# Patient Record
Sex: MALE | Race: WHITE | Employment: FULL TIME | URBAN - NONMETROPOLITAN AREA
[De-identification: names, ages, dates, MRNs, and addresses within clinical notes are randomized per-mention and may not be internally consistent; named-entity substitution may affect disease eponyms.]

---

## 2024-10-03 ENCOUNTER — TELEPHONE (OUTPATIENT)
Dept: CARDIOLOGY CLINIC | Age: 64
End: 2024-10-03

## 2024-10-04 NOTE — TELEPHONE ENCOUNTER
Pt has an appt on monday with Dr. Angeles and he jordan l call us back monday and let us know how to proceed.

## 2024-10-07 ENCOUNTER — OFFICE VISIT (OUTPATIENT)
Dept: CARDIOLOGY CLINIC | Age: 64
End: 2024-10-07
Payer: COMMERCIAL

## 2024-10-07 VITALS
DIASTOLIC BLOOD PRESSURE: 78 MMHG | HEIGHT: 71 IN | HEART RATE: 61 BPM | BODY MASS INDEX: 28.56 KG/M2 | WEIGHT: 204 LBS | SYSTOLIC BLOOD PRESSURE: 118 MMHG

## 2024-10-07 DIAGNOSIS — G45.9 TIA (TRANSIENT ISCHEMIC ATTACK): Primary | ICD-10-CM

## 2024-10-07 PROCEDURE — 99204 OFFICE O/P NEW MOD 45 MIN: CPT | Performed by: INTERNAL MEDICINE

## 2024-10-07 RX ORDER — MELOXICAM 7.5 MG/1
TABLET ORAL
COMMUNITY
Start: 2024-08-14

## 2024-10-07 RX ORDER — ATORVASTATIN CALCIUM 20 MG/1
TABLET, FILM COATED ORAL
COMMUNITY
Start: 2024-08-14

## 2024-10-07 RX ORDER — CLOPIDOGREL BISULFATE 75 MG/1
TABLET ORAL
COMMUNITY
Start: 2024-10-02

## 2024-10-07 NOTE — PROGRESS NOTES
[facility] [place] [facility]  [facility] CARDIOLOGY  [address]  [address]  [place] [facility] [address]  Dept: [phone]  Dept Fax: [phone]  Loc: [phone]    Visit Date: 10/7/2024    Mr. Bhardwaj is a 64 y.o. male  who presented for:  Chief Complaint   Patient presents with    Follow-up     Referred from Dr. Angeles       HPI:   65 yo M with recent hx of TIA is referred for RUBI.  He underwent echo which showed possible ASD.  He has seen Dr. Henley from cardio and Dr. Plunkett for neurology. Apparently had event monitor, no results for me to review.  He has no residual symptoms.  He is here to discuss RUBI.          Current Outpatient Medications:     meloxicam (MOBIC) 7.5 MG tablet, , Disp: , Rfl:     clopidogrel (PLAVIX) 75 MG tablet, , Disp: , Rfl:     atorvastatin (LIPITOR) 20 MG tablet, , Disp: , Rfl:     Past Medical History  Sesar  has no past medical history on file.    Social History  Sesar  reports that he has never smoked. He has never used smokeless tobacco. He reports current alcohol use. He reports that he does not use drugs.    Family History  Sesar family history includes Cancer in his mother; Heart Attack in his father.    Past Surgical History   Past Surgical History:   Procedure Laterality Date    ROTATOR CUFF REPAIR Left     2016    ROTATOR CUFF REPAIR Right     2023       Subjective:     REVIEW OF SYSTEMS  Constitutional: denies sweats, chills and fever  HENT: denies  congestion, sinus pressure, sneezing and sore throat.    Eyes: denies  pain, discharge, redness and itching.   Respiratory: denies apnea, cough  Gastrointestinal: denies blood in stool, constipation, diarrhea   Endocrine: denies cold intolerance, heat intolerance, polydipsia.  Genitourinary: denies dysuria, enuresis, flank pain and hematuria.   Musculoskeletal: denies arthralgias, joint swelling and neck pain.   Neurological: denies numbness and headaches.   Psychiatric/Behavioral: denies

## 2024-10-18 RX ORDER — SODIUM CHLORIDE 9 MG/ML
25 INJECTION, SOLUTION INTRAVENOUS PRN
Status: DISCONTINUED | OUTPATIENT
Start: 2024-10-18 | End: 2024-10-21 | Stop reason: HOSPADM

## 2024-10-18 RX ORDER — SODIUM CHLORIDE 0.9 % (FLUSH) 0.9 %
5-40 SYRINGE (ML) INJECTION EVERY 12 HOURS SCHEDULED
Status: DISCONTINUED | OUTPATIENT
Start: 2024-10-18 | End: 2024-10-21 | Stop reason: HOSPADM

## 2024-10-18 RX ORDER — SODIUM CHLORIDE 0.9 % (FLUSH) 0.9 %
5-40 SYRINGE (ML) INJECTION PRN
Status: DISCONTINUED | OUTPATIENT
Start: 2024-10-18 | End: 2024-10-21 | Stop reason: HOSPADM

## 2024-10-18 RX ORDER — SODIUM CHLORIDE 9 MG/ML
INJECTION, SOLUTION INTRAVENOUS CONTINUOUS
Status: DISCONTINUED | OUTPATIENT
Start: 2024-10-18 | End: 2024-10-21 | Stop reason: HOSPADM

## 2024-10-21 ENCOUNTER — TELEPHONE (OUTPATIENT)
Dept: CARDIOLOGY CLINIC | Age: 64
End: 2024-10-21

## 2024-10-21 ENCOUNTER — HOSPITAL ENCOUNTER (OUTPATIENT)
Age: 64
Setting detail: OUTPATIENT SURGERY
Discharge: HOME | End: 2024-10-21
Attending: INTERNAL MEDICINE | Admitting: INTERNAL MEDICINE
Payer: COMMERCIAL

## 2024-10-21 ENCOUNTER — APPOINTMENT (OUTPATIENT)
Age: 64
End: 2024-10-21
Attending: INTERNAL MEDICINE
Payer: COMMERCIAL

## 2024-10-21 VITALS
BODY MASS INDEX: 28.22 KG/M2 | RESPIRATION RATE: 14 BRPM | DIASTOLIC BLOOD PRESSURE: 73 MMHG | OXYGEN SATURATION: 93 % | TEMPERATURE: 97.2 F | SYSTOLIC BLOOD PRESSURE: 119 MMHG | WEIGHT: 201.6 LBS | HEART RATE: 53 BPM | HEIGHT: 71 IN

## 2024-10-21 DIAGNOSIS — G45.9 TIA (TRANSIENT ISCHEMIC ATTACK): ICD-10-CM

## 2024-10-21 LAB — ECHO BSA: 2.14 M2

## 2024-10-21 PROCEDURE — 2580000003 HC RX 258: Performed by: INTERNAL MEDICINE

## 2024-10-21 PROCEDURE — 93312 ECHO TRANSESOPHAGEAL: CPT | Performed by: INTERNAL MEDICINE

## 2024-10-21 PROCEDURE — 2709999900 HC NON-CHARGEABLE SUPPLY: Performed by: INTERNAL MEDICINE

## 2024-10-21 PROCEDURE — 6370000000 HC RX 637 (ALT 250 FOR IP)

## 2024-10-21 PROCEDURE — 99152 MOD SED SAME PHYS/QHP 5/>YRS: CPT | Performed by: INTERNAL MEDICINE

## 2024-10-21 PROCEDURE — 6360000002 HC RX W HCPCS: Performed by: INTERNAL MEDICINE

## 2024-10-21 PROCEDURE — 7100000010 HC PHASE II RECOVERY - FIRST 15 MIN: Performed by: INTERNAL MEDICINE

## 2024-10-21 PROCEDURE — 7100000011 HC PHASE II RECOVERY - ADDTL 15 MIN: Performed by: INTERNAL MEDICINE

## 2024-10-21 PROCEDURE — 93312 ECHO TRANSESOPHAGEAL: CPT

## 2024-10-21 RX ORDER — FENTANYL CITRATE 50 UG/ML
INJECTION, SOLUTION INTRAMUSCULAR; INTRAVENOUS PRN
Status: DISCONTINUED | OUTPATIENT
Start: 2024-10-21 | End: 2024-10-21 | Stop reason: ALTCHOICE

## 2024-10-21 RX ORDER — MIDAZOLAM HYDROCHLORIDE 1 MG/ML
INJECTION, SOLUTION INTRAMUSCULAR; INTRAVENOUS PRN
Status: DISCONTINUED | OUTPATIENT
Start: 2024-10-21 | End: 2024-10-21 | Stop reason: ALTCHOICE

## 2024-10-21 RX ORDER — ASPIRIN 81 MG/1
81 TABLET ORAL DAILY
COMMUNITY

## 2024-10-21 RX ADMIN — SODIUM CHLORIDE: 9 INJECTION, SOLUTION INTRAVENOUS at 10:48

## 2024-10-21 ASSESSMENT — PAIN - FUNCTIONAL ASSESSMENT
PAIN_FUNCTIONAL_ASSESSMENT: 0-10
PAIN_FUNCTIONAL_ASSESSMENT: NONE - DENIES PAIN
PAIN_FUNCTIONAL_ASSESSMENT: NONE - DENIES PAIN

## 2024-10-21 NOTE — H&P
[facility]  Sedation/Analgesia History & Physical    Pt Name: Sesar Bhardwaj  Account number: [id number]  MRN: [id number]  YOB: 1960  Provider Performing Procedure: Antonio Saldana MD MD [facility]  Primary Care Physician: Jasvir Huertas MD  Date: 10/21/2024    PRE-PROCEDURE    Code Status: FULL CODE  Brief History/Pre-Procedure Diagnosis: recent TIA, ?ASD    Consent: : I have discussed with the patient risks, benefits, and alternatives (and relevant risks, benefits, and side effects related to alternatives or not receiving care), and likelihood of the success.   The patient and/or representative appear to understand and agree to proceed.  The discussion encompasses risks, benefits, and side effects related to the alternatives and the risks related to not receiving the proposed care, treatment, and services.       PLANNED PROCEDURE   []Cath  []PCI                []Pacemaker/AICD  [x]RUBI             []Cardioversion []Peripheral angiography/PTA  []Other:      VITAL SIGNS   Vitals:    10/21/24 0939   BP: 121/80   Pulse: 53   Resp: 18   Temp: 97.1 °F (36.2 °C)   SpO2: 100%       PHYSICAL:   General: No acute distress  HEENT:  Unremarkable for age  Neck: without increased JVD, carotid pulses 2+ bilaterally without bruits  Heart: RRR, S1 & S2 WNL   Lungs: Clear to auscultation    Abdomen: BS present, without HSM, masses, or tenderness    Extremities: without C,C,E.  Pulses 2+ bilaterally  Mental Status: Alert & Oriented    SEDATION  Planned agent:[x]Midazolam []Meperidine []Sublimaze []Morphine  []Diazepam  [x]Other: fentanyl      ASA Classification:  []1 []2 [x]3 []4 []5  Class 1: A normal healthy patient  Class 2: Pt with mild to moderate systemic disease  Class 3: Severe systemic disease or disturbance  Class 4: Severe systemic disorders that are already life threatening.  Class 5: Moribund pt with little chances of survival, for more than 24 hours.  Mallampati I Airway Classification:   []1

## 2024-10-21 NOTE — TELEPHONE ENCOUNTER
Antonio Saldana MD  [facility] Clinical Staff  Please refer patient to Dr. Dutton for possible ASD closure due to recent CVA.  He follows up with Dr. Plunkett at [facility]. Thanks

## 2024-10-21 NOTE — PROGRESS NOTES
Pt in phase 2 of recovery. Denies pain, Fully awake. Vitals stable, IV removed. Pt received numbing spray in the mouth before procedure, he was instructed no to eat/ drink for an hour. Educated patient and wife Lelo on sedation effects for next 24 hours.     Dr. Saldana spoke with patient and family regarding  procedure findings and future plan.

## 2024-10-22 ENCOUNTER — OFFICE VISIT (OUTPATIENT)
Dept: CARDIOLOGY CLINIC | Age: 64
End: 2024-10-22
Payer: COMMERCIAL

## 2024-10-22 VITALS
DIASTOLIC BLOOD PRESSURE: 78 MMHG | HEART RATE: 57 BPM | WEIGHT: 201 LBS | SYSTOLIC BLOOD PRESSURE: 128 MMHG | BODY MASS INDEX: 28.14 KG/M2 | HEIGHT: 71 IN

## 2024-10-22 DIAGNOSIS — G45.9 TIA (TRANSIENT ISCHEMIC ATTACK): ICD-10-CM

## 2024-10-22 DIAGNOSIS — Q21.11 ASD SECUNDUM: Primary | ICD-10-CM

## 2024-10-22 PROCEDURE — 99205 OFFICE O/P NEW HI 60 MIN: CPT | Performed by: INTERNAL MEDICINE

## 2024-10-22 NOTE — PROGRESS NOTES
Here to discuss ASD closure.   Follows. Dr Plunkett in [facility].   
Antonio Saldana MD at [facility] ENDOSCOPY       Review of Systems   Constitutional: Negative for chills and fever  HENT: Negative for congestion, sinus pressure, sneezing and sore throat.    Eyes: Negative for pain, discharge, redness and itching.   Respiratory: Negative for apnea, cough  Gastrointestinal: Negative for blood in stool, constipation, diarrhea   Endocrine: Negative for cold intolerance, heat intolerance, polydipsia.  Genitourinary: Negative for dysuria, enuresis, flank pain and hematuria.   Musculoskeletal: Negative for arthralgias, joint swelling and neck pain.   Neurological: Negative for numbness and headaches.   Psychiatric/Behavioral: Negative for agitation, confusion, decreased concentration and dysphoric mood.     Objective:     /78   Pulse 57   Ht 1.803 m (5' 11\")   Wt 91.2 kg (201 lb)   BMI 28.03 kg/m²     Wt Readings from Last 3 Encounters:   10/22/24 91.2 kg (201 lb)   10/21/24 91.4 kg (201 lb 9.6 oz)   10/07/24 92.5 kg (204 lb)     BP Readings from Last 3 Encounters:   10/22/24 128/78   10/21/24 119/73   10/07/24 118/78       Nursing note and vitals reviewed.    Physical Exam   Constitutional: Oriented to person, place, and time. Appears well-developed and well-nourished.   HENT:   Head: Normocephalic and atraumatic.   Eyes: EOM are normal. Pupils are equal, round, and reactive to light.   Neck: Normal range of motion. Neck supple. No JVD present.   Cardiovascular: Normal rate, regular rhythm, normal heart sounds and intact distal pulses.    No murmur heard.  Pulmonary/Chest: Effort normal and breath sounds normal. No respiratory distress. No wheezes. No rales.   Abdominal: Soft. Bowel sounds are normal. No distension. There is no tenderness.   Musculoskeletal: Normal range of motion. No edema.   Neurological: Alert and oriented to person, place, and time. No cranial nerve deficit. Coordination normal.   Skin: Skin is warm and dry.   Psychiatric: Normal mood and affect.       No

## 2024-10-25 ENCOUNTER — TELEPHONE (OUTPATIENT)
Dept: CARDIOLOGY CLINIC | Age: 64
End: 2024-10-25

## 2024-10-25 DIAGNOSIS — Q21.10 ATRIAL SEPTAL DEFECT: Primary | ICD-10-CM

## 2024-10-25 NOTE — TELEPHONE ENCOUNTER
Orders received from Dr. Dutton to schedule the patient for a PFO closure and have the patient hold medications day of.    Patient on no OAC  PFO: 11/8 at 0900 with an arrival of 0700  Discharge home with spouse Lelo Renee with [place] and ICE rep    Post PFO instructions per Dr. Dutton: have the patient be seen in the [facility] 7 days post PFO closure, obtain an ECHO prior to the 30 day post PFO follow up appointment. This is an overnight procedure and the patient will need to have an ECHO POD1 before discharge.    7 day post PFO appointment: 11/14 at 1000 am mekhi Giordano CNP  30 day ECHO: 12/9/24 at 1145 am   30 day post PFO appointment: 12/5/24 at 1130 am mekhi Dutton   6 month ECHO: 5/5/25 @ 1145 am  6 month appointment: 5/9/25 @ 1030 am mekhi Giordano CNP      Patient has been given instructions and a set of instructions have been mailed out.      Ana M can you please prior auth this?

## 2024-10-28 NOTE — TELEPHONE ENCOUNTER
PENDING    Auth # [id number]    Clinicals submitted to [facility] via website

## 2024-10-31 ENCOUNTER — TELEPHONE (OUTPATIENT)
Dept: CARDIOLOGY CLINIC | Age: 64
End: 2024-10-31

## 2024-10-31 NOTE — TELEPHONE ENCOUNTER
Received monitor strips from 10/31/2024 at 11:31 that show sinus bradycardia with ventricular tachycardia 22 beats and heart rate of 128.    Called patient and states he felt like his heart may have skipped a few beats, but reports otherwise he has been feeling fine.     Advised him to go to the ER with any worsening symptoms such as chest pain. Patient was agreeable.    Strips scanned into media.

## 2024-11-04 NOTE — TELEPHONE ENCOUNTER
Received monitor strips from 11/01/2024 at 17:23 that show sinus rhythm and sinus bradycardia with PVC's and Ventricular Tachycardia 4 beats with rate of 156 BPM.    Called patient who stated he intermittently feels like his heart is skipping beats, but nothing significant.     Strips scanned in to media.

## 2024-11-07 ENCOUNTER — PREP FOR PROCEDURE (OUTPATIENT)
Dept: CARDIOLOGY | Age: 64
End: 2024-11-07

## 2024-11-07 RX ORDER — SODIUM CHLORIDE 0.9 % (FLUSH) 0.9 %
5-40 SYRINGE (ML) INJECTION PRN
Status: CANCELLED | OUTPATIENT
Start: 2024-11-07

## 2024-11-07 RX ORDER — SODIUM CHLORIDE 0.9 % (FLUSH) 0.9 %
5-40 SYRINGE (ML) INJECTION EVERY 12 HOURS SCHEDULED
Status: CANCELLED | OUTPATIENT
Start: 2024-11-07

## 2024-11-07 RX ORDER — CHLORHEXIDINE GLUCONATE 40 MG/ML
SOLUTION TOPICAL ONCE
Status: CANCELLED | OUTPATIENT
Start: 2024-11-07 | End: 2024-11-07

## 2024-11-07 RX ORDER — SODIUM CHLORIDE 9 MG/ML
INJECTION, SOLUTION INTRAVENOUS CONTINUOUS
Status: CANCELLED | OUTPATIENT
Start: 2024-11-07

## 2024-11-07 RX ORDER — SODIUM CHLORIDE 9 MG/ML
INJECTION, SOLUTION INTRAVENOUS PRN
Status: CANCELLED | OUTPATIENT
Start: 2024-11-07

## 2024-11-08 ENCOUNTER — HOSPITAL ENCOUNTER (OUTPATIENT)
Age: 64
Discharge: HOME | End: 2024-11-09
Attending: INTERNAL MEDICINE | Admitting: INTERNAL MEDICINE
Payer: COMMERCIAL

## 2024-11-08 DIAGNOSIS — Q21.10 ATRIAL SEPTAL DEFECT: ICD-10-CM

## 2024-11-08 DIAGNOSIS — I63.9 CEREBROVASCULAR ACCIDENT (CVA), UNSPECIFIED MECHANISM (HCC): Primary | ICD-10-CM

## 2024-11-08 LAB
ABO: NORMAL
ACTIVATED CLOTTING TIME: 299 SECONDS (ref 1–150)
ALBUMIN SERPL BCG-MCNC: 3.6 G/DL (ref 3.5–5.1)
ALP SERPL-CCNC: 91 U/L (ref 38–126)
ALT SERPL W/O P-5'-P-CCNC: 31 U/L (ref 11–66)
ANION GAP SERPL CALC-SCNC: 9 MEQ/L (ref 8–16)
ANTIBODY SCREEN: NORMAL
APTT PPP: 30.1 SECONDS (ref 22–38)
AST SERPL-CCNC: 22 U/L (ref 5–40)
BILIRUB SERPL-MCNC: 0.7 MG/DL (ref 0.3–1.2)
BUN SERPL-MCNC: 20 MG/DL (ref 7–22)
CALCIUM SERPL-MCNC: 9.2 MG/DL (ref 8.5–10.5)
CHLORIDE SERPL-SCNC: 106 MEQ/L (ref 98–111)
CO2 SERPL-SCNC: 25 MEQ/L (ref 23–33)
CREAT SERPL-MCNC: 1 MG/DL (ref 0.4–1.2)
DEPRECATED RDW RBC AUTO: 41.4 FL (ref 35–45)
ECHO BSA: 2.13 M2
EKG ATRIAL RATE: 49 BPM
EKG ATRIAL RATE: 62 BPM
EKG P AXIS: 35 DEGREES
EKG P AXIS: 7 DEGREES
EKG P-R INTERVAL: 186 MS
EKG P-R INTERVAL: 194 MS
EKG Q-T INTERVAL: 410 MS
EKG Q-T INTERVAL: 438 MS
EKG QRS DURATION: 94 MS
EKG QRS DURATION: 96 MS
EKG QTC CALCULATION (BAZETT): 395 MS
EKG QTC CALCULATION (BAZETT): 416 MS
EKG R AXIS: -17 DEGREES
EKG R AXIS: -6 DEGREES
EKG T AXIS: 11 DEGREES
EKG T AXIS: 29 DEGREES
EKG VENTRICULAR RATE: 49 BPM
EKG VENTRICULAR RATE: 62 BPM
ERYTHROCYTE [DISTWIDTH] IN BLOOD BY AUTOMATED COUNT: 11.9 % (ref 11.5–14.5)
GFR SERPL CREATININE-BSD FRML MDRD: 84 ML/MIN/1.73M2
GLUCOSE SERPL-MCNC: 98 MG/DL (ref 70–108)
HCT VFR BLD AUTO: 40.6 % (ref 42–52)
HGB BLD-MCNC: 13.7 GM/DL (ref 14–18)
INR PPP: 1.09 (ref 0.85–1.13)
MCH RBC QN AUTO: 32.2 PG (ref 26–33)
MCHC RBC AUTO-ENTMCNC: 33.7 GM/DL (ref 32.2–35.5)
MCV RBC AUTO: 95.5 FL (ref 80–94)
NT-PROBNP SERPL IA-MCNC: 72.7 PG/ML (ref 0–124)
PLATELET # BLD AUTO: 242 THOU/MM3 (ref 130–400)
PMV BLD AUTO: 9.8 FL (ref 9.4–12.4)
POTASSIUM SERPL-SCNC: 4.6 MEQ/L (ref 3.5–5.2)
PROT SERPL-MCNC: 6.7 G/DL (ref 6.1–8)
RBC # BLD AUTO: 4.25 MILL/MM3 (ref 4.7–6.1)
RH FACTOR: NORMAL
SODIUM SERPL-SCNC: 140 MEQ/L (ref 135–145)
WBC # BLD AUTO: 6.2 THOU/MM3 (ref 4.8–10.8)

## 2024-11-08 PROCEDURE — 86900 BLOOD TYPING SEROLOGIC ABO: CPT

## 2024-11-08 PROCEDURE — C1894 INTRO/SHEATH, NON-LASER: HCPCS | Performed by: INTERNAL MEDICINE

## 2024-11-08 PROCEDURE — 6370000000 HC RX 637 (ALT 250 FOR IP): Performed by: INTERNAL MEDICINE

## 2024-11-08 PROCEDURE — 93580 TRANSCATH CLOSURE OF ASD: CPT | Performed by: INTERNAL MEDICINE

## 2024-11-08 PROCEDURE — 83880 ASSAY OF NATRIURETIC PEPTIDE: CPT

## 2024-11-08 PROCEDURE — 85730 THROMBOPLASTIN TIME PARTIAL: CPT

## 2024-11-08 PROCEDURE — C1759 CATH, INTRA ECHOCARDIOGRAPHY: HCPCS | Performed by: INTERNAL MEDICINE

## 2024-11-08 PROCEDURE — 93005 ELECTROCARDIOGRAM TRACING: CPT | Performed by: PHYSICIAN ASSISTANT

## 2024-11-08 PROCEDURE — 85027 COMPLETE CBC AUTOMATED: CPT

## 2024-11-08 PROCEDURE — C1769 GUIDE WIRE: HCPCS | Performed by: INTERNAL MEDICINE

## 2024-11-08 PROCEDURE — 6360000002 HC RX W HCPCS: Performed by: INTERNAL MEDICINE

## 2024-11-08 PROCEDURE — 86923 COMPATIBILITY TEST ELECTRIC: CPT

## 2024-11-08 PROCEDURE — 93010 ELECTROCARDIOGRAM REPORT: CPT | Performed by: NUCLEAR MEDICINE

## 2024-11-08 PROCEDURE — 85610 PROTHROMBIN TIME: CPT

## 2024-11-08 PROCEDURE — 99153 MOD SED SAME PHYS/QHP EA: CPT | Performed by: INTERNAL MEDICINE

## 2024-11-08 PROCEDURE — 36415 COLL VENOUS BLD VENIPUNCTURE: CPT

## 2024-11-08 PROCEDURE — 99152 MOD SED SAME PHYS/QHP 5/>YRS: CPT | Performed by: INTERNAL MEDICINE

## 2024-11-08 PROCEDURE — C1760 CLOSURE DEV, VASC: HCPCS | Performed by: INTERNAL MEDICINE

## 2024-11-08 PROCEDURE — 86901 BLOOD TYPING SEROLOGIC RH(D): CPT

## 2024-11-08 PROCEDURE — 85347 COAGULATION TIME ACTIVATED: CPT

## 2024-11-08 PROCEDURE — 93005 ELECTROCARDIOGRAM TRACING: CPT | Performed by: INTERNAL MEDICINE

## 2024-11-08 PROCEDURE — 2500000003 HC RX 250 WO HCPCS: Performed by: INTERNAL MEDICINE

## 2024-11-08 PROCEDURE — 80053 COMPREHEN METABOLIC PANEL: CPT

## 2024-11-08 PROCEDURE — 2709999900 HC NON-CHARGEABLE SUPPLY: Performed by: INTERNAL MEDICINE

## 2024-11-08 PROCEDURE — 7100000011 HC PHASE II RECOVERY - ADDTL 15 MIN: Performed by: INTERNAL MEDICINE

## 2024-11-08 PROCEDURE — 2580000003 HC RX 258: Performed by: INTERNAL MEDICINE

## 2024-11-08 PROCEDURE — 7100000010 HC PHASE II RECOVERY - FIRST 15 MIN: Performed by: INTERNAL MEDICINE

## 2024-11-08 PROCEDURE — 2580000003 HC RX 258: Performed by: PHYSICIAN ASSISTANT

## 2024-11-08 PROCEDURE — C1817 SEPTAL DEFECT IMP SYS: HCPCS | Performed by: INTERNAL MEDICINE

## 2024-11-08 PROCEDURE — 86850 RBC ANTIBODY SCREEN: CPT

## 2024-11-08 PROCEDURE — 6360000002 HC RX W HCPCS: Performed by: PHYSICIAN ASSISTANT

## 2024-11-08 DEVICE — GORE CARDIOFORM ASD OCCLUDER 32 MM 10FR
Type: IMPLANTABLE DEVICE | Status: FUNCTIONAL
Brand: GORE CARDIOFORM ASD OCCLUDER

## 2024-11-08 RX ORDER — MIDAZOLAM HYDROCHLORIDE 1 MG/ML
INJECTION, SOLUTION INTRAMUSCULAR; INTRAVENOUS PRN
Status: DISCONTINUED | OUTPATIENT
Start: 2024-11-08 | End: 2024-11-08 | Stop reason: HOSPADM

## 2024-11-08 RX ORDER — ONDANSETRON 2 MG/ML
4 INJECTION INTRAMUSCULAR; INTRAVENOUS EVERY 6 HOURS PRN
Status: DISCONTINUED | OUTPATIENT
Start: 2024-11-08 | End: 2024-11-09 | Stop reason: HOSPADM

## 2024-11-08 RX ORDER — FENTANYL CITRATE 50 UG/ML
INJECTION, SOLUTION INTRAMUSCULAR; INTRAVENOUS PRN
Status: DISCONTINUED | OUTPATIENT
Start: 2024-11-08 | End: 2024-11-08 | Stop reason: HOSPADM

## 2024-11-08 RX ORDER — SODIUM CHLORIDE 9 MG/ML
INJECTION, SOLUTION INTRAVENOUS PRN
Status: DISCONTINUED | OUTPATIENT
Start: 2024-11-08 | End: 2024-11-09 | Stop reason: HOSPADM

## 2024-11-08 RX ORDER — SODIUM CHLORIDE 9 MG/ML
INJECTION, SOLUTION INTRAVENOUS CONTINUOUS
Status: DISCONTINUED | OUTPATIENT
Start: 2024-11-08 | End: 2024-11-09 | Stop reason: HOSPADM

## 2024-11-08 RX ORDER — ONDANSETRON 4 MG/1
4 TABLET, ORALLY DISINTEGRATING ORAL EVERY 8 HOURS PRN
Status: DISCONTINUED | OUTPATIENT
Start: 2024-11-08 | End: 2024-11-09 | Stop reason: HOSPADM

## 2024-11-08 RX ORDER — HEPARIN SODIUM 10000 [USP'U]/ML
INJECTION, SOLUTION INTRAVENOUS; SUBCUTANEOUS PRN
Status: DISCONTINUED | OUTPATIENT
Start: 2024-11-08 | End: 2024-11-08 | Stop reason: HOSPADM

## 2024-11-08 RX ORDER — CLOPIDOGREL 300 MG/1
TABLET, FILM COATED ORAL PRN
Status: DISCONTINUED | OUTPATIENT
Start: 2024-11-08 | End: 2024-11-08 | Stop reason: HOSPADM

## 2024-11-08 RX ORDER — CLOPIDOGREL BISULFATE 75 MG/1
75 TABLET ORAL DAILY
Qty: 30 TABLET | Refills: 3 | Status: SHIPPED | OUTPATIENT
Start: 2024-11-09

## 2024-11-08 RX ORDER — ASPIRIN 81 MG/1
81 TABLET ORAL DAILY
Status: DISCONTINUED | OUTPATIENT
Start: 2024-11-09 | End: 2024-11-09 | Stop reason: HOSPADM

## 2024-11-08 RX ORDER — SODIUM CHLORIDE 9 MG/ML
INJECTION, SOLUTION INTRAVENOUS CONTINUOUS
Status: DISCONTINUED | OUTPATIENT
Start: 2024-11-08 | End: 2024-11-08

## 2024-11-08 RX ORDER — BISACODYL 5 MG/1
5 TABLET, DELAYED RELEASE ORAL DAILY PRN
Status: DISCONTINUED | OUTPATIENT
Start: 2024-11-08 | End: 2024-11-09 | Stop reason: HOSPADM

## 2024-11-08 RX ORDER — ACETAMINOPHEN 325 MG/1
650 TABLET ORAL EVERY 4 HOURS PRN
Status: DISCONTINUED | OUTPATIENT
Start: 2024-11-08 | End: 2024-11-09 | Stop reason: HOSPADM

## 2024-11-08 RX ORDER — POLYETHYLENE GLYCOL 3350 17 G/17G
17 POWDER, FOR SOLUTION ORAL DAILY PRN
Status: DISCONTINUED | OUTPATIENT
Start: 2024-11-08 | End: 2024-11-09 | Stop reason: HOSPADM

## 2024-11-08 RX ORDER — CLOPIDOGREL BISULFATE 75 MG/1
75 TABLET ORAL DAILY
Status: DISCONTINUED | OUTPATIENT
Start: 2024-11-09 | End: 2024-11-09 | Stop reason: HOSPADM

## 2024-11-08 RX ORDER — SODIUM CHLORIDE 0.9 % (FLUSH) 0.9 %
5-40 SYRINGE (ML) INJECTION PRN
Status: DISCONTINUED | OUTPATIENT
Start: 2024-11-08 | End: 2024-11-09 | Stop reason: HOSPADM

## 2024-11-08 RX ORDER — PROTAMINE SULFATE 10 MG/ML
INJECTION, SOLUTION INTRAVENOUS PRN
Status: DISCONTINUED | OUTPATIENT
Start: 2024-11-08 | End: 2024-11-08 | Stop reason: HOSPADM

## 2024-11-08 RX ORDER — HYDRALAZINE HYDROCHLORIDE 20 MG/ML
10 INJECTION INTRAMUSCULAR; INTRAVENOUS EVERY 10 MIN PRN
Status: DISCONTINUED | OUTPATIENT
Start: 2024-11-08 | End: 2024-11-09 | Stop reason: HOSPADM

## 2024-11-08 RX ORDER — SODIUM CHLORIDE 9 MG/ML
INJECTION, SOLUTION INTRAVENOUS PRN
Status: DISCONTINUED | OUTPATIENT
Start: 2024-11-08 | End: 2024-11-08 | Stop reason: HOSPADM

## 2024-11-08 RX ORDER — SODIUM CHLORIDE 0.9 % (FLUSH) 0.9 %
5-40 SYRINGE (ML) INJECTION PRN
Status: DISCONTINUED | OUTPATIENT
Start: 2024-11-08 | End: 2024-11-08 | Stop reason: HOSPADM

## 2024-11-08 RX ORDER — SODIUM CHLORIDE 0.9 % (FLUSH) 0.9 %
5-40 SYRINGE (ML) INJECTION EVERY 12 HOURS SCHEDULED
Status: DISCONTINUED | OUTPATIENT
Start: 2024-11-08 | End: 2024-11-08 | Stop reason: HOSPADM

## 2024-11-08 RX ORDER — CHLORHEXIDINE GLUCONATE 40 MG/ML
SOLUTION TOPICAL ONCE
Status: DISCONTINUED | OUTPATIENT
Start: 2024-11-08 | End: 2024-11-08 | Stop reason: HOSPADM

## 2024-11-08 RX ORDER — ATORVASTATIN CALCIUM 20 MG/1
40 TABLET, FILM COATED ORAL DAILY
Status: DISCONTINUED | OUTPATIENT
Start: 2024-11-08 | End: 2024-11-09 | Stop reason: HOSPADM

## 2024-11-08 RX ORDER — ATROPINE SULFATE 0.4 MG/ML
0.5 INJECTION, SOLUTION INTRAVENOUS
Status: DISCONTINUED | OUTPATIENT
Start: 2024-11-08 | End: 2024-11-09 | Stop reason: HOSPADM

## 2024-11-08 RX ORDER — SODIUM CHLORIDE 0.9 % (FLUSH) 0.9 %
5-40 SYRINGE (ML) INJECTION EVERY 12 HOURS SCHEDULED
Status: DISCONTINUED | OUTPATIENT
Start: 2024-11-08 | End: 2024-11-09 | Stop reason: HOSPADM

## 2024-11-08 RX ADMIN — SODIUM CHLORIDE: 9 INJECTION, SOLUTION INTRAVENOUS at 08:03

## 2024-11-08 RX ADMIN — WATER 2000 MG: 1 INJECTION INTRAMUSCULAR; INTRAVENOUS; SUBCUTANEOUS at 09:57

## 2024-11-08 RX ADMIN — ATORVASTATIN CALCIUM 40 MG: 40 TABLET, FILM COATED ORAL at 19:39

## 2024-11-08 RX ADMIN — SODIUM CHLORIDE, PRESERVATIVE FREE 10 ML: 5 INJECTION INTRAVENOUS at 19:39

## 2024-11-08 NOTE — FLOWSHEET NOTE
Received from cath lab. Right groin procedure site stable. Pt aware to keep right leg still head down and not to cross his legs. Wife at bedside. Device card handed to pt's wife. 0.9 normal saline cont. Resting with easy resp. Denies pain or needs.

## 2024-11-08 NOTE — PLAN OF CARE
Problem: Discharge Planning  Goal: Discharge to home or other facility with appropriate resources  11/8/2024 1431 by Scotty Viera, RN  Outcome: Progressing  11/8/2024 0713 by Scotty Viera, RN  Outcome: Progressing    Pt to be observed on 3b post ASD closure procedure.

## 2024-11-08 NOTE — H&P
[facility]  Sedation/Analgesia History & Physical    Pt Name: Sesar Bhardwaj  Account number: [id number]  MRN: [id number]  YOB: 1960  Provider Performing Procedure: Elias Dutton MD MD  Referring Provider: Elias Dutton MD   Primary Care Physician: Jasvir Huertas MD  Date: 11/8/2024    PRE-PROCEDURE    Code Status: FULL CODE  Brief History/Pre-Procedure Diagnosis:   ASD with L to R shunt  RV dilation    Consent: : I have discussed with the patient risks, benefits, and alternatives (and relevant risks, benefits, and side effects related to alternatives or not receiving care), and likelihood of the success.   The patient and/or representative appear to understand and agree to proceed.  The discussion encompasses risks, benefits, and side effects related to the alternatives and the risks related to not receiving the proposed care, treatment, and services.     The indication, risks and benefits of the procedure and possible therapeutic consequences and alternatives were discussed with the patient. The patient was given the opportunity to ask questions and to have them answered to his/her satisfaction. Risks of the procedure include but are not limited to the following: Bleeding, hematoma including retroperitoneal hemmorhage, infection, pain and discomfort, injury to the aorta and other blood vessels, rhythm disturbance, low blood pressure, myocardial infarction, stroke, kidney damage/failure, myocardial perforation, allergic reactions to sedatives/contrast material, loss of pulse/vascular compromise requiring surgery, aneurysm/pseudoaneurysm formation, possible loss of a limb/hand/leg, needing blood transfusion, requiring emergent open heart surgery or emergent coronary intervention, the need for intubation/respiratory support, the requirement for defibrillation/cardioversion, and death. Alternatives to and omission of the suggested procedure were discussed. The patient had no

## 2024-11-08 NOTE — FLOWSHEET NOTE
Patient admitted to 2E17  Ambulatory for ASD surgery.  Patient NPO. Patient accompanied by family.  Vital signs obtained.   Assessment and data collection intiated.   Oriented to room.  Policies and procedures for  explained.   All questions answered with no further questions at this time.   Fall prevention and safety precautions discussed with patient.     Explained patients right to have family, representative or physician notified of their admission.  Patient has Declined for physician to be notified.  Patient has Declined for family/representative to be notified.

## 2024-11-08 NOTE — PROGRESS NOTES
Seen and evaluated post ASD closure.  Resting comfortably in bed on 2E with wife at bedside.  Denies chest discomfort or dyspnea.  No nausea or vomiting.  Has had something to eat and drink and tolerating well.  Right groin site dressing dry and intact.  No bleeding, swelling or hematoma.  Lower extremities neurovascularly intact.  Awaiting transfer to  for continued postprocedural monitoring overnight per post ASD closure protocol.  Vital signs, heart rate and rhythm stable.  Understands will have echo in the morning.  Is to be up and out of bed once bed rest requirements completed.  Encouraged to walk in yousif this evening and in the morning.  Dr. Dutton will see in the morning.  Follow-up ASD echo in the a.m. anticipated discharge to home on 11/9/2024.  All questions answered to their satisfaction.  Raquel Giordano, APRN - CNP

## 2024-11-08 NOTE — PROGRESS NOTES
Inpatient Cardiac Rehabilitation Consult    Received consult for Phase II Cardiac Rehabilitation.  Patient does not meet qualifications for cardiac rehabilitation.

## 2024-11-08 NOTE — DISCHARGE INSTRUCTIONS
Follow-up in the office on 11/19/2024 with SONIA Giordano CNP as scheduled.    Post Patent Foramen Ovale Closure (PFO) Discharge Instructions  We are here for you! If you have any questions please call: [facility] Heart Team [phone]                             ACTIVITY:  NO DRIVING for 48 hours following procedure.  You may ride in a car.   You may return to work after 5 days following the PFO closure Procedure.  No flights of stairs or no heavy lifting for the seven days after the procedure. Then proceed to normal activities as prior to the procedure.   Do not lift more than five to ten pounds for the first week you are home.  (A gallon of milk is 7 lbs)  Get up and get dressed every day. Avoid wearing tight or restrictive clothing. Do not stay in bed. Set a daily routine. This is an important step in re-gaining your strength.  Walk as much as you can.  This will help facilitate the healing process.  Plan rest periods during the day.  If possible avoid strenuous or vigorous activities and exercise if possible (I.e. climbing stairs)  Avoid work that increases muscle tension.        (straining with bowel movements, moving furniture, etc.)  -  While coughing, sneezing or during bowel movement, support the puncture site by applying gentle compression with the palm of your hand    WOUND CARE:  You may shower 24 hours post procedure. Shower every day. No bathtubs, pools, or hot tubs for the first week you are home.   Cleanse wound with mild soap and water.  Reapply a clean bandaid on the puncture site daily times 5 days or until site is healed.  Keep wound dry.   A small amount of bloody or clear drainage is normal.   Watch for signs of infections: redness, incision hot to the touch, fever greater than 101 degrees, swelling at the groin or incision site, or discolored drainage from your incision.     NORMAL OBSERVATIONS:  - Slight bump or groin tenderness, which may last up to one week.  - Some bruising or

## 2024-11-08 NOTE — DISCHARGE SUMMARY
or no heavy lifting for the seven days after the procedure. Then proceed to normal activities as prior to the procedure.   Do not lift more than five to ten pounds for the first week you are home.  (A gallon of milk is 7 lbs)  Get up and get dressed every day. Avoid wearing tight or restrictive clothing. Do not stay in bed. Set a daily routine. This is an important step in re-gaining your strength.  Walk as much as you can.  This will help facilitate the healing process.  Plan rest periods during the day.  If possible avoid strenuous or vigorous activities and exercise if possible (I.e. climbing stairs)  Avoid work that increases muscle tension.        (straining with bowel movements, moving furniture, etc.)  -  While coughing, sneezing or during bowel movement, support the puncture site by applying gentle compression with the palm of your hand    WOUND CARE:  You may shower 24 hours post procedure. Shower every day. No bathtubs, pools, or hot tubs for the first week you are home.   Cleanse wound with mild soap and water.  Reapply a clean bandaid on the puncture site daily times 5 days or until site is healed.  Keep wound dry.   A small amount of bloody or clear drainage is normal.   Watch for signs of infections: redness, incision hot to the touch, fever greater than 101 degrees, swelling at the groin or incision site, or discolored drainage from your incision.   NORMAL OBSERVATIONS:  - Slight bump or groin tenderness, which may last up to one week.  - Some bruising or discomfort/tenderness    NUTRITION:   Low fat, low salt diet (guidelines for Heart Healthy eating)  Limit caffeine to 1-2 cups per day (coffee, tea, chocolate, soda)  Eat high fiber to avoid constipation and straining during bowel movements (fresh veggies and fruit, whole grain)  Limit alcohol to two servings a day ( 8 oz beer, 1 oz liquor, 4 oz wine)    HEALTHY HABITS:  No Smoking!!!! If you need help to stop smoking please call your  infected skin or muscle    PLEASE CALL THE STRUCTURAL HEART OFFICE IF DENTAL PROCEDURES ARE NEEDED [phone]    What to expect post  implant:   -     Office follow up 11/19/2024 with SONIA Giordano CNP  30 day post ASO closure echo will be obtained. If any issues with bleeding within the 45 day period, please contact PFO Coordinator: [phone].  Plavix and Aspirin will continue until the 6 month marialuisa post implant.    Diet: Cardiac Diet     Follow-up visits:   Please report to the [facility] in one week at scheduled time for groin check and assessment.         Follow Up Plan  1. Follow up with Cardiology in 1 week for incision check and post ASO closure f/u.   2. Follow up with primary care provider in 1 week  3. Continue Plavix and Aspirin.  4. Repeat echo and office follow-up at 30 days.  5. Pt is fully agreeable to discharge plans. All questions were thoroughly answered.     Electronically signed by TRE Gabriel CNP on 11/8/2024 at 4:18 PM

## 2024-11-08 NOTE — BRIEF OP NOTE
[facility]  Sedation/Analgesia Post Sedation Record    Pt Name: Sesar Bhardwaj  Account number: [id number]  MRN: [id number]  YOB: 1960  Procedure Performed By: Elias Dutton MD MD FACC, [facility], RPVI  Primary Care Physician: Jasvir Huertas MD  Date: 11/8/2024    POST-PROCEDURE    Physicians/Assistants: Elias Dutton MD, GAMALIEL, [facility], VI    Procedure Performed: ASO closure    Sedation/Anesthesia: Versed/ Fentanyl and 2% xylocaine local anesthesia.      Estimated Blood Loss: < 50 ml.     Specimens Removed: None         Disposition of Specimen: N/A        Complications: No Immediate Complications.       Post-procedure Diagnosis/Findings:       32 mm [place]      Electronically signed by Elias Dutton MD on 11/8/24 at 11:42 AM EST   Interventional Cardiology

## 2024-11-09 ENCOUNTER — APPOINTMENT (OUTPATIENT)
Age: 64
End: 2024-11-09
Attending: INTERNAL MEDICINE
Payer: COMMERCIAL

## 2024-11-09 VITALS
DIASTOLIC BLOOD PRESSURE: 78 MMHG | SYSTOLIC BLOOD PRESSURE: 113 MMHG | OXYGEN SATURATION: 94 % | WEIGHT: 200 LBS | BODY MASS INDEX: 28 KG/M2 | HEART RATE: 60 BPM | HEIGHT: 71 IN | TEMPERATURE: 98.4 F | RESPIRATION RATE: 18 BRPM

## 2024-11-09 LAB
ANION GAP SERPL CALC-SCNC: 7 MEQ/L (ref 8–16)
APTT PPP: 29.6 SECONDS (ref 22–38)
BUN SERPL-MCNC: 15 MG/DL (ref 7–22)
CALCIUM SERPL-MCNC: 8.8 MG/DL (ref 8.5–10.5)
CHLORIDE SERPL-SCNC: 104 MEQ/L (ref 98–111)
CO2 SERPL-SCNC: 27 MEQ/L (ref 23–33)
CREAT SERPL-MCNC: 0.9 MG/DL (ref 0.4–1.2)
DEPRECATED RDW RBC AUTO: 39.3 FL (ref 35–45)
ECHO AO ASC DIAM: 3.4 CM
ECHO AO ASCENDING AORTA INDEX: 1.61 CM/M2
ECHO AV CUSP MM: 2.4 CM
ECHO AV PEAK GRADIENT: 8 MMHG
ECHO AV PEAK VELOCITY: 1.4 M/S
ECHO AV VELOCITY RATIO: 0.64
ECHO BSA: 2.13 M2
ECHO EST RA PRESSURE: 5 MMHG
ECHO LA AREA 2C: 15.3 CM2
ECHO LA AREA 4C: 17.8 CM2
ECHO LA DIAMETER INDEX: 1.94 CM/M2
ECHO LA DIAMETER: 4.1 CM
ECHO LA MAJOR AXIS: 5.9 CM
ECHO LA MINOR AXIS: 5.2 CM
ECHO LA VOL BP: 41 ML (ref 18–58)
ECHO LA VOL MOD A2C: 36 ML (ref 18–58)
ECHO LA VOL MOD A4C: 42 ML (ref 18–58)
ECHO LA VOL/BSA BIPLANE: 19 ML/M2 (ref 16–34)
ECHO LA VOLUME INDEX MOD A2C: 17 ML/M2 (ref 16–34)
ECHO LA VOLUME INDEX MOD A4C: 20 ML/M2 (ref 16–34)
ECHO LV E' LATERAL VELOCITY: 13.5 CM/S
ECHO LV E' SEPTAL VELOCITY: 7.1 CM/S
ECHO LV EJECTION FRACTION BIPLANE: 56 % (ref 55–100)
ECHO LV FRACTIONAL SHORTENING: 30 % (ref 28–44)
ECHO LV INTERNAL DIMENSION DIASTOLE INDEX: 2.23 CM/M2
ECHO LV INTERNAL DIMENSION DIASTOLIC: 4.7 CM (ref 4.2–5.9)
ECHO LV INTERNAL DIMENSION SYSTOLIC INDEX: 1.56 CM/M2
ECHO LV INTERNAL DIMENSION SYSTOLIC: 3.3 CM
ECHO LV ISOVOLUMETRIC RELAXATION TIME (IVRT): 85 MS
ECHO LV IVSD: 1 CM (ref 0.6–1)
ECHO LV MASS 2D: 142.7 G (ref 88–224)
ECHO LV MASS INDEX 2D: 67.6 G/M2 (ref 49–115)
ECHO LV POSTERIOR WALL DIASTOLIC: 0.8 CM (ref 0.6–1)
ECHO LV RELATIVE WALL THICKNESS RATIO: 0.34
ECHO LVOT PEAK GRADIENT: 3 MMHG
ECHO LVOT PEAK VELOCITY: 0.9 M/S
ECHO MV A VELOCITY: 0.56 M/S
ECHO MV E DECELERATION TIME (DT): 327 MS
ECHO MV E VELOCITY: 0.54 M/S
ECHO MV E/A RATIO: 0.96
ECHO MV E/E' LATERAL: 4
ECHO MV E/E' RATIO (AVERAGED): 5.8
ECHO MV E/E' SEPTAL: 7.61
ECHO PULMONARY ARTERY END DIASTOLIC PRESSURE: 4 MMHG
ECHO PV MAX VELOCITY: 0.6 M/S
ECHO PV PEAK GRADIENT: 2 MMHG
ECHO PV REGURGITANT MAX VELOCITY: 1 M/S
ECHO RV INTERNAL DIMENSION: 4.7 CM
ECHO RV TAPSE: 1.8 CM (ref 1.7–?)
ECHO TV E WAVE: 0.5 M/S
EKG ATRIAL RATE: 65 BPM
EKG P AXIS: 10 DEGREES
EKG P-R INTERVAL: 188 MS
EKG Q-T INTERVAL: 392 MS
EKG QRS DURATION: 90 MS
EKG QTC CALCULATION (BAZETT): 407 MS
EKG R AXIS: -10 DEGREES
EKG T AXIS: 32 DEGREES
EKG VENTRICULAR RATE: 65 BPM
ERYTHROCYTE [DISTWIDTH] IN BLOOD BY AUTOMATED COUNT: 11.7 % (ref 11.5–14.5)
GFR SERPL CREATININE-BSD FRML MDRD: > 90 ML/MIN/1.73M2
GLUCOSE SERPL-MCNC: 99 MG/DL (ref 70–108)
HCT VFR BLD AUTO: 39.1 % (ref 42–52)
HGB BLD-MCNC: 13.4 GM/DL (ref 14–18)
INR PPP: 1.05 (ref 0.85–1.13)
MCH RBC QN AUTO: 31.7 PG (ref 26–33)
MCHC RBC AUTO-ENTMCNC: 34.3 GM/DL (ref 32.2–35.5)
MCV RBC AUTO: 92.4 FL (ref 80–94)
PLATELET # BLD AUTO: 226 THOU/MM3 (ref 130–400)
PMV BLD AUTO: 9.7 FL (ref 9.4–12.4)
POTASSIUM SERPL-SCNC: 4.6 MEQ/L (ref 3.5–5.2)
RBC # BLD AUTO: 4.23 MILL/MM3 (ref 4.7–6.1)
SODIUM SERPL-SCNC: 138 MEQ/L (ref 135–145)
WBC # BLD AUTO: 5.7 THOU/MM3 (ref 4.8–10.8)

## 2024-11-09 PROCEDURE — 85610 PROTHROMBIN TIME: CPT

## 2024-11-09 PROCEDURE — 93010 ELECTROCARDIOGRAM REPORT: CPT | Performed by: NUCLEAR MEDICINE

## 2024-11-09 PROCEDURE — 85027 COMPLETE CBC AUTOMATED: CPT

## 2024-11-09 PROCEDURE — 93306 TTE W/DOPPLER COMPLETE: CPT

## 2024-11-09 PROCEDURE — 93005 ELECTROCARDIOGRAM TRACING: CPT | Performed by: INTERNAL MEDICINE

## 2024-11-09 PROCEDURE — 6370000000 HC RX 637 (ALT 250 FOR IP): Performed by: INTERNAL MEDICINE

## 2024-11-09 PROCEDURE — 36415 COLL VENOUS BLD VENIPUNCTURE: CPT

## 2024-11-09 PROCEDURE — 80048 BASIC METABOLIC PNL TOTAL CA: CPT

## 2024-11-09 PROCEDURE — 85730 THROMBOPLASTIN TIME PARTIAL: CPT

## 2024-11-09 PROCEDURE — 93306 TTE W/DOPPLER COMPLETE: CPT | Performed by: INTERNAL MEDICINE

## 2024-11-09 RX ADMIN — ASPIRIN 81 MG: 81 TABLET ORAL at 08:24

## 2024-11-09 RX ADMIN — CLOPIDOGREL BISULFATE 75 MG: 75 TABLET ORAL at 08:24

## 2024-11-09 NOTE — PLAN OF CARE
Problem: Discharge Planning  Goal: Discharge to home or other facility with appropriate resources  Outcome: Adequate for Discharge     Problem: Chronic Conditions and Co-morbidities  Goal: Patient's chronic conditions and co-morbidity symptoms are monitored and maintained or improved  Outcome: Adequate for Discharge     Problem: Safety - Adult  Goal: Free from fall injury  Outcome: Adequate for Discharge     Problem: Cardiovascular - Adult  Goal: Maintains optimal cardiac output and hemodynamic stability  Outcome: Adequate for Discharge  Goal: Absence of cardiac dysrhythmias or at baseline  Outcome: Adequate for Discharge

## 2024-11-09 NOTE — PROGRESS NOTES
Went over AVS paperwork with patient and answered all questions and concerns. Went over follow up appointments. Went over new medication and where to pick his prescription up at. Patient left with hospital transport via wheelchair in stable condition with all belongings.

## 2024-11-09 NOTE — PROCEDURES
PROCEDURE NOTE  Date: 11/9/2024   Name: Sesar Bhardwaj  YOB: 1960    Procedures  12 lead EKG completed. Results handed to Silva MEYER.

## 2024-11-09 NOTE — DISCHARGE SUMMARY
Structural Heart/Cardiology Discharge Summary       Name:  Sesar Bhardwaj  YOB: 1960  Medical Record Number:  [id number]    Date of Admission:  11/8/2024  Date of Discharge:  11/9/2024    Admitting physician: Elias Dutton MD  Discharge to: Home  Condition at d/c: Stable    Hospital Problem List:  Patient Active Problem List   Diagnosis    ASD (atrial septal defect)    Cerebrovascular accident (CVA) ([facility])       Pre-procedure Diagnosis: ASD with L to R shunt; RV dilation with CVA    Procedures: ASO closure with 32 mm [place] on 11/8/2024    Hospital Course:   Sesar Bhardwaj is a 64 y.o. male admitted to [facility] on 11/8/2024 post successful percutaneous ASO closure with 32 mm [place] device. He tolerated the procedure well and was admitted to  for continued monitoring and evaluation post procedure.   He remained stable overnight. Mr. Bhardwaj is doing well following implant. No respiratiory, cardiac or vascular access issues after the procedure. The patient is ambulating at their baseline level.  24 hour post ASO closure echo was deemed stable.     Discharge physical examination:   Vitals:    11/09/24 0725   BP: 113/78   Pulse: 60   Resp: 18   Temp: 98.4 °F (36.9 °C)   SpO2: 94%     General Appearance: alert ,conversing, in no acute distress  Cardiovascular: Regular rate and rhythm; with grade II/VImurmur LSB; no rubs, clicks, or gallops  Pulmonary/Chest: clear to auscultation bilaterally- no wheezes, rales or rhonchi, normal air movement, no respiratory distress, RA  Neurological: alert, oriented, normal speech, no focal findings or movement disorder noted.   Pulses: Bilateral radial, femoral, DP, and PT pulses noted  Lower Extremity: No edema noted. R Groin incisions healing appropriately-No signs of infection or hematoma.    Discharge Medications:         Medication List        CHANGE how you take these medications      clopidogrel 75 MG tablet  Commonly known  and maintain your ideal body weight.  We suggest that you walk as much as you can, but do not exhaust yourself.  Set a goal and work your way up to it at a paced rate.     MEDICATIONS:  DO NOT STOP TAKING ANY MEDICATION WITHOUT SPEAKING WITH YOUR CARDIOLOGIST!    Take your pills as ordered at time of discharge.   Continue all anticoagulation as instructed at discharge.  Avoid all over the counter medications, herbal, and natural supplements unless you have discussed them with your doctor.    It is important to follow your doctor's orders, especially if blood thinning drugs are prescribed.  Your doctor will monitor your medicine and advise you when or if you can stop taking it.       What symptoms or health problems do you need to look out for after you leave the hospital? Call your physician if you have any of these symptoms ([phone]):  Weight pound gain of 3 pounds in one day or 5 pounds in one week. Weight gain is NOT normal.    Chest pain or discomfort  Swelling of the legs, ankles or feet  Increasing shortness of breath  Change in the color or temperature of your lower legs and feet  Develop abdominal pain or unrelieved nausea and/or vomiting  Develop any redness, incision, or limited movement of your arms or legs  Signs of infection: redness, incision hot to the touch, fevers greater than 101 degrees, or colored drainage from your incision  Hematoma is an accumulation of fluid in the tissues at the groin surgical site. Symptoms may include: a lump near the groin surgical site, clear fluid draining from the site, redness, warmth, or swelling.      Antibiotic coverage will be needed post PFO closure for 6 months post implant if the following is needed:       Dental procedures including cleanings      Gastrointestinal (GI) or genitourinary () procedures in patients with ongoing GI or  tract infection      Respiratory procedures involving incision or biopsy      Procedures on infected skin or

## 2024-11-18 ENCOUNTER — TELEPHONE (OUTPATIENT)
Dept: CARDIOLOGY CLINIC | Age: 64
End: 2024-11-18

## 2024-11-18 NOTE — TELEPHONE ENCOUNTER
Please see event strips   Pt has appt with Raquel tomorrow   Anything needed before then?   Spoke with pt  States he did feels some palpitations during that time but nothing like before

## 2024-11-19 ENCOUNTER — OFFICE VISIT (OUTPATIENT)
Dept: CARDIOLOGY CLINIC | Age: 64
End: 2024-11-19

## 2024-11-19 VITALS
DIASTOLIC BLOOD PRESSURE: 72 MMHG | SYSTOLIC BLOOD PRESSURE: 114 MMHG | HEART RATE: 62 BPM | BODY MASS INDEX: 28.42 KG/M2 | WEIGHT: 203 LBS | HEIGHT: 71 IN

## 2024-11-19 DIAGNOSIS — I48.0 PAROXYSMAL ATRIAL FIBRILLATION (HCC): Primary | ICD-10-CM

## 2024-11-19 DIAGNOSIS — Q21.11 ASD SECUNDUM: ICD-10-CM

## 2024-11-19 DIAGNOSIS — Z87.74 S/P ATRIAL SEPTAL DEFECT CLOSURE: ICD-10-CM

## 2024-11-19 NOTE — PROGRESS NOTES
7 day post PFO    Last EKG 11-9-24    Pt denies cp, dizziness, BLE    C/o sob, palpitations  
  Left Atrium Left atrium size is normal.   Interatrial Septum ASD closure device seen in good position without color flow across the device.   Right Ventricle Right ventricle size is normal. Normal systolic function.   Right Atrium Right atrium size is normal.   Aortic Valve Valve structure is normal. No regurgitation. No stenosis.   Mitral Valve Valve structure is normal. Trace regurgitation. No stenosis noted.   Tricuspid Valve Valve structure is normal. Trace regurgitation. No stenosis noted.   Pulmonic Valve The pulmonic valve visualization is suboptimal but appears to be functioning normally. Physiologically normal regurgitation. No stenosis noted.   Aorta Normal sized aortic root and ascending aorta.   IVC/Hepatic Veins IVC diameter is less than or equal to 21 mm and decreases greater than 50% during inspiration; therefore the estimated right atrial pressure is normal (~3 mmHg). IVC size is normal.   Pericardium No pericardial effusion.     11/8/2024: ASD closure:     Pre Procedure Diagnosis    Atrial septal defect [Q21.10]  RV dilation  Paradoxical CVA    Post Procedure Diagnosis    S/p ASD closure with ICE      Recommendations    1.  Bedrest.  2.  Optimal medical therapy.  3.  Risk factor management.  4.  Routine access site care.  5.  IV Fluids.  6.  DAPT for at least six months.  7.  Overnight inpatient observation and admission.  8.  Pain control  9.   TTE in the AM, at 30 days, and then at 6 months  10.  Follow up in office two to four weeks postprocedure.   Successful Percutaneous Secundum ASD closure with a [place] Cardioform 32 mm Septal Occluder using ICE guidance   Elias Dutton MD    Assessment/Plan   Secundum ASD with small/deficient aortic rim  S/p Successful Percutaneous Secundum ASD closure with a [place] Cardioform 32 mm Septal Occluder using ICE guidance 11/8/2024   New onset atrial fib post ASD closure  TIA  RV dilation    Event monitor noting PAF on 11/21 - patient was contacted - feeling

## 2024-11-19 NOTE — PATIENT INSTRUCTIONS
Continue current medications as prescribed.    Stay as active as you can.     Eat heart healthy diet.     Follow-up with your PCP as scheduled.    Follow-up with Dr. Dutton on 12/12/2024 as scheduled or sooner if need.     Have the echo done on 12/9/2024 as scheduled.

## 2024-11-21 ENCOUNTER — HOSPITAL ENCOUNTER (OUTPATIENT)
Dept: MRI IMAGING | Age: 64
Discharge: HOME | End: 2024-11-21
Attending: RADIOLOGY

## 2024-11-21 ENCOUNTER — OFFICE VISIT (OUTPATIENT)
Dept: NEUROSURGERY | Age: 64
End: 2024-11-21
Payer: COMMERCIAL

## 2024-11-21 VITALS
BODY MASS INDEX: 28.42 KG/M2 | HEIGHT: 71 IN | HEART RATE: 56 BPM | SYSTOLIC BLOOD PRESSURE: 117 MMHG | DIASTOLIC BLOOD PRESSURE: 74 MMHG | WEIGHT: 203 LBS

## 2024-11-21 DIAGNOSIS — Z00.6 ENCOUNTER FOR EXAMINATION FOR NORMAL COMPARISON AND CONTROL IN CLINICAL RESEARCH PROGRAM: ICD-10-CM

## 2024-11-21 DIAGNOSIS — M54.12 CERVICAL RADICULOPATHY: Primary | ICD-10-CM

## 2024-11-21 PROCEDURE — 99204 OFFICE O/P NEW MOD 45 MIN: CPT | Performed by: PHYSICIAN ASSISTANT

## 2024-11-21 ASSESSMENT — ENCOUNTER SYMPTOMS
APNEA: 0
SHORTNESS OF BREATH: 0
CHEST TIGHTNESS: 0

## 2024-11-21 NOTE — PROGRESS NOTES
[facility] NEUROSURGERY DEPARTMENT  [address] [place] [address]  [address]  [place] [address]  Dept: [phone]  Dept Fax: [phone]      Patient Name:  Sesar Bhardwaj  Visit Date:  11/21/2024    HPI:     Mr. Bhardwaj is a 64 y.o. male that presents today at [facility] Neurosurgery for evaluation of the following: He arrives to our service as a referral for evaluation of radiating neck pain    Chief Complaint   Patient presents with    New Patient     New patient         HPI   Mr. Bhardwaj is a pleasant, active 64-year-old male, a never smoker tobacco a nonuser of smokeless tobacco or vaping products admits to regular alcohol consumption and has a medical history consistent with atrial septal defect, hyperlipidemia, and sleep apnea with a surgical history that includes rotator cuff repair bilaterally dating to 2016 and 2023 with cardiac procedure to address atrial septal defect on 11/8/2024.  He arrives today as a referral to our service for radiating neck pain.  Today's appointment includes for review an MRI of the cervical spine imaged on 10/2/2024 which reveals multilevel degenerative changes most pronounced at C4-5 and C5-6.    At today's visit he arrives unaccompanied and ambulating without assistance and is comfortable with pain very well-controlled.  He denied significant neck pain stating that the visit is necessitated due to some numbness and tingling in the right arm extending to the fingers of the right hand.  The actual MRI image was unavailable for direct view but we did review the report and based on that report feel that it is reasonable to order for review a CT scan as well as some electrical studies to ascertain the source of his symptom presentation.               Medications:    Current Outpatient Medications:     clopidogrel (PLAVIX) 75 MG tablet, Take 1 tablet by mouth daily, Disp: 30 tablet, Rfl: 3    aspirin 81 MG EC tablet, Take 1 tablet by mouth

## 2024-11-22 ENCOUNTER — TELEPHONE (OUTPATIENT)
Dept: ADMINISTRATIVE | Age: 64
End: 2024-11-22

## 2024-11-22 ENCOUNTER — TELEPHONE (OUTPATIENT)
Dept: CARDIOLOGY CLINIC | Age: 64
End: 2024-11-22

## 2024-11-22 RX ORDER — METOPROLOL SUCCINATE 25 MG/1
25 TABLET, EXTENDED RELEASE ORAL DAILY
Qty: 30 TABLET | Refills: 3 | Status: SHIPPED | OUTPATIENT
Start: 2024-11-22

## 2024-11-22 NOTE — TELEPHONE ENCOUNTER
Strips received.  Spoke with patient, he states he does get feelings of extra beats or an apprehensive feeling at times.     No history of Afib in chart.     Discussed with Raquel. Please agree:  She sent in Eliquis 5 mg BID and Toprol 25 mg daily.   Keep December appointment with Dr Dutton    Pt notified.     Coupon card for Eliquis called into pharmacy.   Pt has to activate the card. He was notified and given the information.

## 2024-11-22 NOTE — TELEPHONE ENCOUNTER
Patient states [facility] stated he couldn't have the CT done this year due to the order date is listed for future 2025 and needs it to state December of 2024 with no expiration so he can have it done before his appointment in 12/27.

## 2024-11-22 NOTE — TELEPHONE ENCOUNTER
[facility] also called the office. Spoke to [facility], they are going to get the patient scheduled before his appointment with Dr. Lockett. [facility] was supposed to be calling the patient today to arrange this.

## 2024-11-22 NOTE — TELEPHONE ENCOUNTER
Message left on nurse line from Dr Plunkett's office.  They received strips, new onset Afib.   They faxed to our office for review and recommendations.    No strips received.  Call to Dr Plunkett's office, closed as of noon today.  LM for return call on Monday.

## 2024-12-09 ENCOUNTER — HOSPITAL ENCOUNTER (OUTPATIENT)
Age: 64
Discharge: HOME | End: 2024-12-11
Attending: INTERNAL MEDICINE
Payer: COMMERCIAL

## 2024-12-09 DIAGNOSIS — Q21.10 ATRIAL SEPTAL DEFECT: ICD-10-CM

## 2024-12-09 PROCEDURE — 93306 TTE W/DOPPLER COMPLETE: CPT

## 2024-12-10 LAB
ECHO AO ASC DIAM: 3.7 CM
ECHO AV CUSP MM: 2.4 CM
ECHO AV MEAN GRADIENT: 2 MMHG
ECHO AV MEAN VELOCITY: 0.7 M/S
ECHO AV PEAK GRADIENT: 4 MMHG
ECHO AV PEAK VELOCITY: 1 M/S
ECHO AV VELOCITY RATIO: 1
ECHO AV VTI: 25 CM
ECHO EST RA PRESSURE: 3 MMHG
ECHO LA AREA 2C: 21.2 CM2
ECHO LA AREA 4C: 21.2 CM2
ECHO LA DIAMETER: 4 CM
ECHO LA MAJOR AXIS: 6.9 CM
ECHO LA MINOR AXIS: 5.8 CM
ECHO LA VOL BP: 61 ML (ref 18–58)
ECHO LA VOL MOD A2C: 62 ML (ref 18–58)
ECHO LA VOL MOD A4C: 51 ML (ref 18–58)
ECHO LV E' LATERAL VELOCITY: 10.3 CM/S
ECHO LV E' SEPTAL VELOCITY: 5.4 CM/S
ECHO LV EDV A2C: 107 ML
ECHO LV EDV A4C: 118 ML
ECHO LV EJECTION FRACTION A2C: 58 %
ECHO LV EJECTION FRACTION A4C: 55 %
ECHO LV EJECTION FRACTION BIPLANE: 57 % (ref 55–100)
ECHO LV ESV A2C: 45 ML
ECHO LV ESV A4C: 53 ML
ECHO LV FRACTIONAL SHORTENING: 33 % (ref 28–44)
ECHO LV INTERNAL DIMENSION DIASTOLIC: 5.4 CM (ref 4.2–5.9)
ECHO LV INTERNAL DIMENSION SYSTOLIC: 3.6 CM
ECHO LV ISOVOLUMETRIC RELAXATION TIME (IVRT): 95 MS
ECHO LV IVSD: 0.9 CM (ref 0.6–1)
ECHO LV MASS 2D: 180.1 G (ref 88–224)
ECHO LV POSTERIOR WALL DIASTOLIC: 0.9 CM (ref 0.6–1)
ECHO LV RELATIVE WALL THICKNESS RATIO: 0.33
ECHO LVOT AV VTI INDEX: 0.86
ECHO LVOT MEAN GRADIENT: 2 MMHG
ECHO LVOT PEAK GRADIENT: 4 MMHG
ECHO LVOT PEAK VELOCITY: 1 M/S
ECHO LVOT VTI: 21.6 CM
ECHO MV A VELOCITY: 0.45 M/S
ECHO MV E DECELERATION TIME (DT): 314 MS
ECHO MV E VELOCITY: 0.5 M/S
ECHO MV E/A RATIO: 1.11
ECHO MV E/E' LATERAL: 4.85
ECHO MV E/E' RATIO (AVERAGED): 7.06
ECHO MV E/E' SEPTAL: 9.26
ECHO MV REGURGITANT PEAK GRADIENT: 36 MMHG
ECHO MV REGURGITANT PEAK VELOCITY: 3 M/S
ECHO PULMONARY ARTERY END DIASTOLIC PRESSURE: 4 MMHG
ECHO PV MAX VELOCITY: 0.5 M/S
ECHO PV PEAK GRADIENT: 1 MMHG
ECHO PV REGURGITANT MAX VELOCITY: 1.1 M/S
ECHO RIGHT VENTRICULAR SYSTOLIC PRESSURE (RVSP): 25 MMHG
ECHO RV INTERNAL DIMENSION: 4.2 CM
ECHO RV TAPSE: 1.9 CM (ref 1.7–?)
ECHO TV E WAVE: 0.7 M/S
ECHO TV REGURGITANT MAX VELOCITY: 2.33 M/S
ECHO TV REGURGITANT PEAK GRADIENT: 22 MMHG

## 2024-12-10 PROCEDURE — 93306 TTE W/DOPPLER COMPLETE: CPT | Performed by: INTERNAL MEDICINE

## 2024-12-11 DIAGNOSIS — M54.12 CERVICAL RADICULOPATHY: Primary | ICD-10-CM

## 2024-12-11 NOTE — PATIENT INSTRUCTIONS
Your nurses today were BETSY Mcfarlane and CRIS Alejandre  Your provider today was Dr. Dutton  Phone number: [phone]      You may receive a survey regarding the care you received during your visit.  Your input is valuable to us.  We encourage you to complete and return your survey.  We hope you will choose us in the future for your healthcare needs.

## 2024-12-12 ENCOUNTER — OFFICE VISIT (OUTPATIENT)
Dept: CARDIOLOGY CLINIC | Age: 64
End: 2024-12-12
Payer: COMMERCIAL

## 2024-12-12 VITALS
HEIGHT: 71 IN | SYSTOLIC BLOOD PRESSURE: 127 MMHG | WEIGHT: 208 LBS | BODY MASS INDEX: 29.12 KG/M2 | HEART RATE: 48 BPM | DIASTOLIC BLOOD PRESSURE: 85 MMHG

## 2024-12-12 DIAGNOSIS — Q21.10 ATRIAL SEPTAL DEFECT: ICD-10-CM

## 2024-12-12 DIAGNOSIS — I48.0 PAROXYSMAL ATRIAL FIBRILLATION (HCC): Primary | ICD-10-CM

## 2024-12-12 PROCEDURE — 99214 OFFICE O/P EST MOD 30 MIN: CPT | Performed by: INTERNAL MEDICINE

## 2024-12-12 NOTE — PROGRESS NOTES
[facility] [place] [facility]  [facility] CARDIOLOGY  [address]  [address]  [facility] [address]  Dept: [phone]  Dept Fax: [phone]  Loc: [phone]    Visit Date: 12/12/2024    Mr. Bhardwaj is a 64 y.o. male  who presented for:  Chief Complaint   Patient presents with    Follow-up     30 day follow up        HPI:     History of Present Illness  The patient is a 64-year-old male who presents for evaluation of atrial fibrillation.    He reports an overall good health status, with the exception of experiencing atrial fibrillation post-procedure. He was prescribed Eliquis, which effectively managed the condition. However, he continued to experience occasional palpitations, although not indicative of atrial fibrillation or fluttering. These symptoms have been absent for the past 2 to 3 weeks. He has not utilized a monitor since his last procedure around Thanksgiving. He recalls that his atrial fibrillation was often triggered by stressors such as driving or worrying. He expresses a desire to discontinue all medications. He also reports no discoloration or signs of infection.  Afib started within 2 weeks of ASD closure.      MEDICATIONS  Current: Eliquis, Plavix, aspirin       12/2024 TTE:    Left Ventricle: Normal left ventricular systolic function with a visually estimated EF of 55 - 60%. Left ventricle size is normal. Normal wall thickness. Normal wall motion. Indeterminate diastolic function.    Tricuspid Valve: Trace regurgitation. The estimated RVSP is 25 mmHg.    Aorta: Ao ascending diameter is 3.7 cm.    IVC/SVC: IVC diameter is less than or equal to 21 mm and decreases less than 50% during inspiration; therefore the estimated right atrial pressure is intermediate (~8 mmHg).    Image quality is adequate.      Current Outpatient Medications:     apixaban (ELIQUIS) 5 MG TABS tablet, Take 1 tablet by mouth 2 times daily, Disp: 60 tablet, Rfl: 3    metoprolol succinate (TOPROL

## 2024-12-16 ENCOUNTER — HOSPITAL ENCOUNTER (OUTPATIENT)
Age: 64
Discharge: HOME | End: 2024-12-18
Attending: INTERNAL MEDICINE
Payer: COMMERCIAL

## 2024-12-16 DIAGNOSIS — I48.0 PAROXYSMAL ATRIAL FIBRILLATION (HCC): ICD-10-CM

## 2024-12-16 PROCEDURE — 93270 REMOTE 30 DAY ECG REV/REPORT: CPT

## 2024-12-19 ENCOUNTER — HOSPITAL ENCOUNTER (OUTPATIENT)
Dept: CT IMAGING | Age: 64
Discharge: HOME | End: 2024-12-19
Attending: RADIOLOGY

## 2024-12-19 ENCOUNTER — OFFICE VISIT (OUTPATIENT)
Dept: NEUROSURGERY | Age: 64
End: 2024-12-19
Payer: COMMERCIAL

## 2024-12-19 VITALS
WEIGHT: 208 LBS | HEIGHT: 71 IN | DIASTOLIC BLOOD PRESSURE: 75 MMHG | HEART RATE: 56 BPM | BODY MASS INDEX: 29.12 KG/M2 | SYSTOLIC BLOOD PRESSURE: 125 MMHG

## 2024-12-19 DIAGNOSIS — M54.12 CERVICAL RADICULOPATHY: Primary | ICD-10-CM

## 2024-12-19 DIAGNOSIS — Z00.6 EXAMINATION FOR NORMAL COMPARISON FOR CLINICAL RESEARCH: ICD-10-CM

## 2024-12-19 PROCEDURE — 99214 OFFICE O/P EST MOD 30 MIN: CPT | Performed by: PHYSICIAN ASSISTANT

## 2024-12-19 NOTE — PROGRESS NOTES
types were placed in this encounter.       Assessment/Plan:  Status Post cervical radiculopathy  Doing adequately overall  Encouraged gradual increase in physical and mental activity.  Fall precaution and home safety education provided to patient.  Follow-up: As needed follow-ups.  We recommended a pain management referral, he declined.      Electronically signed by Tin Rice PA-C on 12/19/24 at 9:18 AM EST

## 2025-01-08 DIAGNOSIS — I48.0 PAROXYSMAL ATRIAL FIBRILLATION (HCC): ICD-10-CM

## 2025-01-09 RX ORDER — METOPROLOL SUCCINATE 25 MG/1
25 TABLET, EXTENDED RELEASE ORAL DAILY
Qty: 90 TABLET | Refills: 1 | OUTPATIENT
Start: 2025-01-09

## 2025-01-09 RX ORDER — CLOPIDOGREL BISULFATE 75 MG/1
75 TABLET ORAL DAILY
Qty: 90 TABLET | Refills: 1 | OUTPATIENT
Start: 2025-01-09

## 2025-01-09 RX ORDER — ATORVASTATIN CALCIUM 20 MG/1
20 TABLET, FILM COATED ORAL DAILY
Qty: 90 TABLET | Refills: 1 | OUTPATIENT
Start: 2025-01-09

## 2025-01-09 NOTE — TELEPHONE ENCOUNTER
Pt arrived to floor via wheelchair accompanied by ER nurse and brother. Awake and alert x4. Resp even and unlabored. No apparent distress noted. Pt oriented to room and instructed to call for assistance if needed. Bed locked in lowest position with SR up x 2. Call light and personal belongings within easy reach. Care ongoing.    Pt called is out of medication scripts phoned in .

## 2025-01-13 DIAGNOSIS — I48.0 PAROXYSMAL ATRIAL FIBRILLATION (HCC): ICD-10-CM

## 2025-01-13 RX ORDER — CLOPIDOGREL BISULFATE 75 MG/1
75 TABLET ORAL DAILY
Qty: 90 TABLET | Refills: 1 | OUTPATIENT
Start: 2025-01-13

## 2025-01-13 NOTE — TELEPHONE ENCOUNTER
Pt called pharmacy states did not get script.phoned in again.spoke to pharmacist and needed to reorder.

## 2025-03-03 ENCOUNTER — TELEPHONE (OUTPATIENT)
Dept: CARDIOLOGY CLINIC | Age: 65
End: 2025-03-03

## 2025-03-03 RX ORDER — ASPIRIN 81 MG/1
81 TABLET ORAL DAILY
COMMUNITY

## 2025-03-03 NOTE — TELEPHONE ENCOUNTER
Pt called saying for about a week he has been having on and off nose bleeds.    Please advise   Raquel ISAAC

## 2025-04-15 ENCOUNTER — TELEPHONE (OUTPATIENT)
Dept: CARDIOLOGY CLINIC | Age: 65
End: 2025-04-15

## 2025-04-15 DIAGNOSIS — Q21.12 PFO (PATENT FORAMEN OVALE): Primary | ICD-10-CM

## 2025-04-15 NOTE — TELEPHONE ENCOUNTER
Echo with bubbles scheduled 11/7/2025 at 11:00 am.  Spoke to Central Scheduling.    Follow up scheduled 11/12/2025 at 2:00 pm, with Raquel.    Appointment card and lab orders mailed to patient.

## 2025-04-15 NOTE — TELEPHONE ENCOUNTER
Orders received from Dr. Dutton to schedule the patient for a 1 year post PFO closure follow up appointment with Raquel and ECHO with bubbles prior    PFO closed on 11/8/24    Melanie, would you be willing to schedule this?

## 2025-05-05 ENCOUNTER — HOSPITAL ENCOUNTER (OUTPATIENT)
Age: 65
Discharge: HOME | End: 2025-05-05
Payer: MEDICARE

## 2025-05-05 ENCOUNTER — HOSPITAL ENCOUNTER (OUTPATIENT)
Age: 65
Discharge: HOME | End: 2025-05-07
Attending: INTERNAL MEDICINE
Payer: MEDICARE

## 2025-05-05 DIAGNOSIS — Q21.10 ATRIAL SEPTAL DEFECT: ICD-10-CM

## 2025-05-05 DIAGNOSIS — Q21.12 PFO (PATENT FORAMEN OVALE): ICD-10-CM

## 2025-05-05 LAB
ANION GAP SERPL CALC-SCNC: 7 MEQ/L (ref 8–16)
BUN SERPL-MCNC: 19 MG/DL (ref 8–23)
CALCIUM SERPL-MCNC: 9.3 MG/DL (ref 8.8–10.2)
CHLORIDE SERPL-SCNC: 103 MEQ/L (ref 98–111)
CO2 SERPL-SCNC: 27 MEQ/L (ref 22–29)
CREAT SERPL-MCNC: 0.9 MG/DL (ref 0.7–1.2)
DEPRECATED RDW RBC AUTO: 42.2 FL (ref 35–45)
ECHO AO ASC DIAM: 3.2 CM
ECHO AV CUSP MM: 2.2 CM
ECHO AV PEAK GRADIENT: 4 MMHG
ECHO AV PEAK VELOCITY: 1.1 M/S
ECHO AV VELOCITY RATIO: 0.73
ECHO EST RA PRESSURE: 3 MMHG
ECHO LA AREA 2C: 16.3 CM2
ECHO LA AREA 4C: 16.7 CM2
ECHO LA DIAMETER: 4.1 CM
ECHO LA MAJOR AXIS: 5.3 CM
ECHO LA MINOR AXIS: 5.5 CM
ECHO LA VOL BP: 40 ML (ref 18–58)
ECHO LA VOL MOD A2C: 38 ML (ref 18–58)
ECHO LA VOL MOD A4C: 41 ML (ref 18–58)
ECHO LV E' LATERAL VELOCITY: 8.4 CM/S
ECHO LV E' SEPTAL VELOCITY: 5.7 CM/S
ECHO LV EF PHYSICIAN: 55 %
ECHO LV FRACTIONAL SHORTENING: 29 % (ref 28–44)
ECHO LV INTERNAL DIMENSION DIASTOLIC: 5.1 CM (ref 4.2–5.9)
ECHO LV INTERNAL DIMENSION SYSTOLIC: 3.6 CM
ECHO LV IVSD: 0.9 CM (ref 0.6–1)
ECHO LV MASS 2D: 163.6 G (ref 88–224)
ECHO LV POSTERIOR WALL DIASTOLIC: 0.9 CM (ref 0.6–1)
ECHO LV RELATIVE WALL THICKNESS RATIO: 0.35
ECHO LVOT PEAK GRADIENT: 3 MMHG
ECHO LVOT PEAK VELOCITY: 0.8 M/S
ECHO MV A VELOCITY: 0.53 M/S
ECHO MV E DECELERATION TIME (DT): 239 MS
ECHO MV E VELOCITY: 0.68 M/S
ECHO MV E/A RATIO: 1.28
ECHO MV E/E' LATERAL: 8.1
ECHO MV E/E' RATIO (AVERAGED): 10.01
ECHO MV E/E' SEPTAL: 11.93
ECHO MV REGURGITANT PEAK GRADIENT: 77 MMHG
ECHO MV REGURGITANT PEAK VELOCITY: 4.4 M/S
ECHO PULMONARY ARTERY END DIASTOLIC PRESSURE: 3 MMHG
ECHO PV MAX VELOCITY: 0.5 M/S
ECHO PV PEAK GRADIENT: 1 MMHG
ECHO PV REGURGITANT MAX VELOCITY: 0.9 M/S
ECHO RIGHT VENTRICULAR SYSTOLIC PRESSURE (RVSP): 25 MMHG
ECHO RV INTERNAL DIMENSION: 3.1 CM
ECHO RV TAPSE: 2 CM (ref 1.7–?)
ECHO TV E WAVE: 0.4 M/S
ECHO TV REGURGITANT MAX VELOCITY: 2.32 M/S
ECHO TV REGURGITANT PEAK GRADIENT: 22 MMHG
ERYTHROCYTE [DISTWIDTH] IN BLOOD BY AUTOMATED COUNT: 12.3 % (ref 11.5–14.5)
GFR SERPL CREATININE-BSD FRML MDRD: > 90 ML/MIN/1.73M2
GLUCOSE SERPL-MCNC: 95 MG/DL (ref 74–109)
HCT VFR BLD AUTO: 42.1 % (ref 42–52)
HGB BLD-MCNC: 14.3 GM/DL (ref 14–18)
MCH RBC QN AUTO: 31.6 PG (ref 26–33)
MCHC RBC AUTO-ENTMCNC: 34 GM/DL (ref 32.2–35.5)
MCV RBC AUTO: 92.9 FL (ref 80–94)
PLATELET # BLD AUTO: 231 THOU/MM3 (ref 130–400)
PMV BLD AUTO: 9.7 FL (ref 9.4–12.4)
POTASSIUM SERPL-SCNC: 4.8 MEQ/L (ref 3.5–5.2)
RBC # BLD AUTO: 4.53 MILL/MM3 (ref 4.7–6.1)
SODIUM SERPL-SCNC: 137 MEQ/L (ref 135–145)
WBC # BLD AUTO: 4.2 THOU/MM3 (ref 4.8–10.8)

## 2025-05-05 PROCEDURE — 85027 COMPLETE CBC AUTOMATED: CPT

## 2025-05-05 PROCEDURE — 80048 BASIC METABOLIC PNL TOTAL CA: CPT

## 2025-05-05 PROCEDURE — 36415 COLL VENOUS BLD VENIPUNCTURE: CPT

## 2025-05-05 PROCEDURE — 93306 TTE W/DOPPLER COMPLETE: CPT

## 2025-05-05 PROCEDURE — 93306 TTE W/DOPPLER COMPLETE: CPT | Performed by: INTERNAL MEDICINE

## 2025-05-09 ENCOUNTER — OFFICE VISIT (OUTPATIENT)
Dept: CARDIOLOGY CLINIC | Age: 65
End: 2025-05-09
Payer: MEDICARE

## 2025-05-09 VITALS
DIASTOLIC BLOOD PRESSURE: 72 MMHG | HEIGHT: 71 IN | HEART RATE: 72 BPM | BODY MASS INDEX: 28.14 KG/M2 | OXYGEN SATURATION: 98 % | WEIGHT: 201 LBS | SYSTOLIC BLOOD PRESSURE: 120 MMHG

## 2025-05-09 DIAGNOSIS — I48.0 PAROXYSMAL ATRIAL FIBRILLATION (HCC): ICD-10-CM

## 2025-05-09 DIAGNOSIS — Z86.73 HISTORY OF TIA (TRANSIENT ISCHEMIC ATTACK): ICD-10-CM

## 2025-05-09 DIAGNOSIS — Z87.74 S/P ATRIAL SEPTAL DEFECT CLOSURE: Primary | ICD-10-CM

## 2025-05-09 DIAGNOSIS — Q21.11 ASD SECUNDUM: ICD-10-CM

## 2025-05-09 PROCEDURE — 99214 OFFICE O/P EST MOD 30 MIN: CPT | Performed by: NURSE PRACTITIONER

## 2025-05-09 PROCEDURE — 1036F TOBACCO NON-USER: CPT | Performed by: NURSE PRACTITIONER

## 2025-05-09 PROCEDURE — G8428 CUR MEDS NOT DOCUMENT: HCPCS | Performed by: NURSE PRACTITIONER

## 2025-05-09 PROCEDURE — 3017F COLORECTAL CA SCREEN DOC REV: CPT | Performed by: NURSE PRACTITIONER

## 2025-05-09 PROCEDURE — G8419 CALC BMI OUT NRM PARAM NOF/U: HCPCS | Performed by: NURSE PRACTITIONER

## 2025-05-09 PROCEDURE — 1123F ACP DISCUSS/DSCN MKR DOCD: CPT | Performed by: NURSE PRACTITIONER

## 2025-05-09 NOTE — PROGRESS NOTES
Pt here for 6 mo check up     Pt continues with heart palpitations , only noticed 3 times ,     Pt has upcoming teeth cleaning has questions regarding antibiotic needed

## 2025-05-09 NOTE — PATIENT INSTRUCTIONS
Continue current medications as prescribed.    Stay as active as you can.     Eat heart healthy diet.     Follow-up with your PCP as scheduled.    Follow-up with TRE Gabriel CNP 11/12/2025 with echo prior. Call with questions or concerns.

## 2025-05-09 NOTE — PROGRESS NOTES
Sesar Bhardwaj (:  1960) is a 65 y.o. male, Established patient, here for 6 months post ASD closure evaluation.      Follow-up and Atrial Fibrillation    HPI:  Last seen in the office on 2024 per Dr. Dutton for 3-month follow-up to ASD closure.  Patient had had transient atrial fibs likely attributable to the device placement.  This had occurred within the initial 45-day postprocedure..  His recent echo had noted complete healing of the device with the right side of the heart returned to normal size and no evidence of valve leakage.  The transient nature of the atrial fibs suggested that if this was a temporary response to the device.  He was to wear a monitor for 30 days.  If no evidence of atrial fibs aching the Eliquis.  He will take Plavix and aspirin for total of 6 months.  Take ASA 81 mg daily for life.           6-month follow-up appointment for ASD closure device placement: 2025  Assessment & Plan  Secundum ASD with small/deficient aortic rim  S/p Successful Percutaneous Secundum ASD closure with a [place] Cardioform 32 mm Septal Occluder using ICE guidance 2024   New onset atrial fib post ASD closure - likely device related  TIA  RV dilation      1. Post-atrial septal defect closure evaluation.  - No chest discomfort, dyspnea, edema, water retention, lightheadedness, dizziness, syncope, or presyncope reported.  - Minor palpitations experienced two or three times since the procedure, with no prolonged episodes. Reports feels couple beats and then nothing more.  - Blood pressure and pulse are stable at 120/72 and 72, respectively.  - Echocardiogram shows preserved left ventricular ejection fraction of 50-55%, with the device in excellent position and no leak.  - Trace of mild mitral valve regurgitation noted.  - Currently on Toprol XL to maintain heart rate stability and tolerating well.  - Advised to monitor symptoms closely and contact the office if persistent irregularities occur.

## 2025-06-27 DIAGNOSIS — I48.0 PAROXYSMAL ATRIAL FIBRILLATION (HCC): ICD-10-CM

## 2025-06-28 RX ORDER — CLOPIDOGREL BISULFATE 75 MG/1
75 TABLET ORAL DAILY
Qty: 90 TABLET | Refills: 1 | Status: SHIPPED | OUTPATIENT
Start: 2025-06-28

## 2025-06-28 RX ORDER — ATORVASTATIN CALCIUM 20 MG/1
20 TABLET, FILM COATED ORAL DAILY
Qty: 90 TABLET | Refills: 1 | Status: SHIPPED | OUTPATIENT
Start: 2025-06-28

## 2025-06-28 RX ORDER — METOPROLOL SUCCINATE 25 MG/1
25 TABLET, EXTENDED RELEASE ORAL DAILY
Qty: 90 TABLET | Refills: 1 | Status: SHIPPED | OUTPATIENT
Start: 2025-06-28

## (undated) DEVICE — DRAPE KIT RAMAPR RADIATION SHIELD

## (undated) DEVICE — Device

## (undated) DEVICE — GLOVE ORTHO 8   MSG9480

## (undated) DEVICE — CATHETER EP ADOL AD 9FR L90CM TEMP SGL HND SELF LOK 4 W TIP

## (undated) DEVICE — ROSEN CURVED WIRE GUIDE: Brand: ROSEN

## (undated) DEVICE — GUIDEWIRE VASC 0.035 INX275 CM X SM CRV LUBRIGREEN SS SAFARI

## (undated) DEVICE — GUIDEWIRE VASC L150CM DIA0.035IN FLX END L7CM J 3MM PTFE

## (undated) DEVICE — PINNACLE INTRODUCER SHEATH: Brand: PINNACLE

## (undated) DEVICE — SYSTEM CLOSURE 6-12 FR VEN VASC VASCADE MVP

## (undated) DEVICE — CATHETER DIAG 6FR L100CM GWIRE 0.038IN S STL POLYUR MP W/ 2

## (undated) DEVICE — 4F (1.0MM ID) X 9CM STIFF4F (1.0 MICRO-STICK®INTRODUCER SE WITH NITINOL GUIDEWIREWITH NITIN WITH RADIOPAQUE TIPWITH RADIOPAQ: Brand: MICRO-STICK SETMICRO-STICK SET